# Patient Record
Sex: FEMALE | Race: OTHER | HISPANIC OR LATINO | URBAN - METROPOLITAN AREA
[De-identification: names, ages, dates, MRNs, and addresses within clinical notes are randomized per-mention and may not be internally consistent; named-entity substitution may affect disease eponyms.]

---

## 2020-09-10 ENCOUNTER — OUTPATIENT (OUTPATIENT)
Dept: OUTPATIENT SERVICES | Facility: HOSPITAL | Age: 30
LOS: 1 days | End: 2020-09-10
Payer: SELF-PAY

## 2020-09-10 ENCOUNTER — APPOINTMENT (OUTPATIENT)
Dept: FAMILY MEDICINE | Facility: HOSPITAL | Age: 30
End: 2020-09-10

## 2020-09-10 DIAGNOSIS — Z00.00 ENCOUNTER FOR GENERAL ADULT MEDICAL EXAMINATION WITHOUT ABNORMAL FINDINGS: ICD-10-CM

## 2020-09-10 PROCEDURE — G0008: CPT

## 2020-09-10 PROCEDURE — 83036 HEMOGLOBIN GLYCOSYLATED A1C: CPT

## 2020-09-10 PROCEDURE — 80053 COMPREHEN METABOLIC PANEL: CPT

## 2020-09-10 PROCEDURE — 80061 LIPID PANEL: CPT

## 2020-09-10 PROCEDURE — 36415 COLL VENOUS BLD VENIPUNCTURE: CPT

## 2020-09-10 PROCEDURE — 85025 COMPLETE CBC W/AUTO DIFF WBC: CPT

## 2020-09-10 PROCEDURE — G0463: CPT

## 2020-09-10 NOTE — HISTORY OF PRESENT ILLNESS
[[employer] ] : provided by [employer]   [FreeTextEntry1] : [id number] [FreeTextEntry2] : Karlene [TWNoteComboBox1] : Prydeinig [de-identified] : 2020\par q28 days\par menarche 13\par denies any cramping\par \par sexually active with men, monogamaous \par \par 2018\par

## 2020-09-13 DIAGNOSIS — Z30.09 ENCOUNTER FOR OTHER GENERAL COUNSELING AND ADVICE ON CONTRACEPTION: ICD-10-CM

## 2020-09-13 DIAGNOSIS — Z12.4 ENCOUNTER FOR SCREENING FOR MALIGNANT NEOPLASM OF CERVIX: ICD-10-CM

## 2020-09-13 DIAGNOSIS — Z11.3 ENCOUNTER FOR SCREENING FOR INFECTIONS WITH A PREDOMINANTLY SEXUAL MODE OF TRANSMISSION: ICD-10-CM

## 2020-09-13 DIAGNOSIS — Z23 ENCOUNTER FOR IMMUNIZATION: ICD-10-CM

## 2020-11-25 ENCOUNTER — OUTPATIENT (OUTPATIENT)
Dept: OUTPATIENT SERVICES | Facility: HOSPITAL | Age: 30
LOS: 1 days | End: 2020-11-25
Payer: SELF-PAY

## 2020-11-25 ENCOUNTER — APPOINTMENT (OUTPATIENT)
Dept: FAMILY MEDICINE | Facility: HOSPITAL | Age: 30
End: 2020-11-25

## 2020-11-25 VITALS
SYSTOLIC BLOOD PRESSURE: 102 MMHG | HEART RATE: 91 BPM | WEIGHT: 107 LBS | OXYGEN SATURATION: 98 % | DIASTOLIC BLOOD PRESSURE: 68 MMHG | TEMPERATURE: 98.5 F | RESPIRATION RATE: 16 BRPM

## 2020-11-25 DIAGNOSIS — N76.0 ACUTE VAGINITIS: ICD-10-CM

## 2020-11-25 DIAGNOSIS — Z98.890 OTHER SPECIFIED POSTPROCEDURAL STATES: ICD-10-CM

## 2020-11-25 DIAGNOSIS — Z30.09 ENCOUNTER FOR OTHER GENERAL COUNSELING AND ADVICE ON CONTRACEPTION: ICD-10-CM

## 2020-11-25 DIAGNOSIS — Z92.29 PERSONAL HISTORY OF OTHER DRUG THERAPY: ICD-10-CM

## 2020-11-25 DIAGNOSIS — B96.89 ACUTE VAGINITIS: ICD-10-CM

## 2020-11-25 DIAGNOSIS — Z00.00 ENCOUNTER FOR GENERAL ADULT MEDICAL EXAMINATION W/OUT ABNORMAL FINDINGS: ICD-10-CM

## 2020-11-25 DIAGNOSIS — Z11.3 ENCOUNTER FOR SCREENING FOR INFECTIONS WITH A PREDOMINANTLY SEXUAL MODE OF TRANSMISSION: ICD-10-CM

## 2020-11-25 DIAGNOSIS — Z00.00 ENCOUNTER FOR GENERAL ADULT MEDICAL EXAMINATION WITHOUT ABNORMAL FINDINGS: ICD-10-CM

## 2020-11-25 PROCEDURE — G0463: CPT

## 2020-11-25 NOTE — ASSESSMENT
[FreeTextEntry1] : 29 year old female who presents for f/u for lab results.\par \par Healthcare maintenance\par - lab results d/w patient\par - pap negative however incidentally found bacterial vaginosis -  will start metronidazole\par - influenza vaccine UTD\par \par F/u PRN\par D/w Dr. Levine

## 2020-11-25 NOTE — HISTORY OF PRESENT ILLNESS
[FreeTextEntry1] : F/u for lab results [de-identified] : 29 year old female who presents for f/u for lab results. No acute complaints or concerns. Denies dysuria, vaginal discharge/pruritus, abdominal pain, nausea, vomiting, fever, chills.

## 2020-11-25 NOTE — PHYSICAL EXAM
[No Acute Distress] : no acute distress [Well Developed] : well developed [Well-Appearing] : well-appearing [EOMI] : extraocular movements intact [Supple] : supple [No Respiratory Distress] : no respiratory distress  [No Accessory Muscle Use] : no accessory muscle use [Clear to Auscultation] : lungs were clear to auscultation bilaterally [Normal Rate] : normal rate  [Regular Rhythm] : with a regular rhythm [Normal S1, S2] : normal S1 and S2 [Soft] : abdomen soft [Non Tender] : non-tender [Grossly Normal Strength/Tone] : grossly normal strength/tone [Normal Gait] : normal gait [Alert and Oriented x3] : oriented to person, place, and time

## 2020-12-01 DIAGNOSIS — N16 RENAL TUBULO-INTERSTITIAL DISORDERS IN DISEASES CLASSIFIED ELSEWHERE: ICD-10-CM

## 2021-06-26 LAB
ALBUMIN SERPL ELPH-MCNC: 4.5 G/DL
ALP BLD-CCNC: 72 U/L
ALT SERPL-CCNC: 12 U/L
ANION GAP SERPL CALC-SCNC: 13 MMOL/L
AST SERPL-CCNC: 18 U/L
BASOPHILS # BLD AUTO: 0.03 K/UL
BASOPHILS NFR BLD AUTO: 0.4 %
BILIRUB SERPL-MCNC: 0.4 MG/DL
BUN SERPL-MCNC: 8 MG/DL
C TRACH RRNA SPEC QL NAA+PROBE: NOT DETECTED
CALCIUM SERPL-MCNC: 9.4 MG/DL
CHLORIDE SERPL-SCNC: 103 MMOL/L
CHOLEST SERPL-MCNC: 166 MG/DL
CHOLEST/HDLC SERPL: 3.1 RATIO
CO2 SERPL-SCNC: 22 MMOL/L
CREAT SERPL-MCNC: 0.72 MG/DL
CYTOLOGY CVX/VAG DOC THIN PREP: ABNORMAL
EOSINOPHIL # BLD AUTO: 0.19 K/UL
EOSINOPHIL NFR BLD AUTO: 2.7 %
ESTIMATED AVERAGE GLUCOSE: 108 MG/DL
GLUCOSE SERPL-MCNC: 107 MG/DL
HBA1C MFR BLD HPLC: 5.4 %
HCT VFR BLD CALC: 40.1 %
HDLC SERPL-MCNC: 54 MG/DL
HGB BLD-MCNC: 13.1 G/DL
IMM GRANULOCYTES NFR BLD AUTO: 0.3 %
LDLC SERPL CALC-MCNC: 98 MG/DL
LYMPHOCYTES # BLD AUTO: 1.84 K/UL
LYMPHOCYTES NFR BLD AUTO: 26.4 %
MAN DIFF?: NORMAL
MCHC RBC-ENTMCNC: 30.4 PG
MCHC RBC-ENTMCNC: 32.7 GM/DL
MCV RBC AUTO: 93 FL
MONOCYTES # BLD AUTO: 0.59 K/UL
MONOCYTES NFR BLD AUTO: 8.5 %
N GONORRHOEA RRNA SPEC QL NAA+PROBE: NOT DETECTED
NEUTROPHILS # BLD AUTO: 4.31 K/UL
NEUTROPHILS NFR BLD AUTO: 61.7 %
PLATELET # BLD AUTO: 346 K/UL
POTASSIUM SERPL-SCNC: 4.2 MMOL/L
PROT SERPL-MCNC: 7 G/DL
RBC # BLD: 4.31 M/UL
RBC # FLD: 12 %
SODIUM SERPL-SCNC: 137 MMOL/L
SOURCE AMPLIFICATION: NORMAL
TRIGL SERPL-MCNC: 72 MG/DL
WBC # FLD AUTO: 6.98 K/UL

## 2022-10-26 ENCOUNTER — OUTPATIENT (OUTPATIENT)
Dept: OUTPATIENT SERVICES | Facility: HOSPITAL | Age: 32
LOS: 1 days | End: 2022-10-26
Payer: SELF-PAY

## 2022-10-26 ENCOUNTER — RESULT CHARGE (OUTPATIENT)
Age: 32
End: 2022-10-26

## 2022-10-26 ENCOUNTER — APPOINTMENT (OUTPATIENT)
Dept: FAMILY MEDICINE | Facility: HOSPITAL | Age: 32
End: 2022-10-26

## 2022-10-26 VITALS
WEIGHT: 106 LBS | TEMPERATURE: 98.4 F | HEIGHT: 59 IN | SYSTOLIC BLOOD PRESSURE: 100 MMHG | DIASTOLIC BLOOD PRESSURE: 62 MMHG | OXYGEN SATURATION: 97 % | RESPIRATION RATE: 16 BRPM | BODY MASS INDEX: 21.37 KG/M2 | HEART RATE: 87 BPM

## 2022-10-26 DIAGNOSIS — Z30.9 ENCOUNTER FOR CONTRACEPTIVE MANAGEMENT, UNSPECIFIED: ICD-10-CM

## 2022-10-26 DIAGNOSIS — Z00.00 ENCOUNTER FOR GENERAL ADULT MEDICAL EXAMINATION WITHOUT ABNORMAL FINDINGS: ICD-10-CM

## 2022-10-26 DIAGNOSIS — R30.0 DYSURIA: ICD-10-CM

## 2022-10-26 LAB
BILIRUB UR QL STRIP: NORMAL
CLARITY UR: CLEAR
COLLECTION METHOD: NORMAL
GLUCOSE UR-MCNC: NORMAL
HCG UR QL: 0.2 EU/DL
HCG UR QL: NEGATIVE
HGB UR QL STRIP.AUTO: NORMAL
KETONES UR-MCNC: NORMAL
LEUKOCYTE ESTERASE UR QL STRIP: NORMAL
NITRITE UR QL STRIP: NORMAL
PH UR STRIP: 7.5
PROT UR STRIP-MCNC: NORMAL
QUALITY CONTROL: YES
SP GR UR STRIP: 1.02

## 2022-10-26 PROCEDURE — 87086 URINE CULTURE/COLONY COUNT: CPT

## 2022-10-26 PROCEDURE — G0463: CPT

## 2022-10-26 PROCEDURE — 81002 URINALYSIS NONAUTO W/O SCOPE: CPT

## 2022-10-26 PROCEDURE — 87077 CULTURE AEROBIC IDENTIFY: CPT

## 2022-10-26 PROCEDURE — 87186 SC STD MICRODIL/AGAR DIL: CPT

## 2022-10-26 NOTE — REVIEW OF SYSTEMS
[Dysuria] : dysuria [Frequency] : frequency [Fever] : no fever [Chills] : no chills [Chest Pain] : no chest pain [Palpitations] : no palpitations [Shortness Of Breath] : no shortness of breath [Wheezing] : no wheezing [Abdominal Pain] : no abdominal pain [Nausea] : no nausea [Vomiting] : no vomiting [Incontinence] : no incontinence [Nocturia] : no nocturia [Hematuria] : no hematuria [Vaginal Discharge] : no vaginal discharge [Dysmenorrhea] : no dysmenorrhea

## 2022-10-26 NOTE — PHYSICAL EXAM
[No Acute Distress] : no acute distress [Well Nourished] : well nourished [No Respiratory Distress] : no respiratory distress  [No Accessory Muscle Use] : no accessory muscle use [Clear to Auscultation] : lungs were clear to auscultation bilaterally [Normal Rate] : normal rate  [Regular Rhythm] : with a regular rhythm [Normal S1, S2] : normal S1 and S2 [Soft] : abdomen soft [Non Tender] : non-tender [Normal Bowel Sounds] : normal bowel sounds [No CVA Tenderness] : no CVA  tenderness

## 2022-10-26 NOTE — HISTORY OF PRESENT ILLNESS
[FreeTextEntry8] :  ID: [id number]\par Patient is a 30 y/o F with no significant PMH presenting with 4 days of dysuria and urinary frequency. She reports having a UTI in the past (4 years ago) and feeling a smilar way. Has tried Azo tablets from the pharmacy without relief. Denies vaginal pruritus. Is currently sexually active and does not use contraception. LMP 10/7/22.

## 2022-10-29 DIAGNOSIS — R30.0 DYSURIA: ICD-10-CM

## 2022-11-21 ENCOUNTER — NON-APPOINTMENT (OUTPATIENT)
Age: 32
End: 2022-11-21

## 2022-11-21 LAB — BACTERIA UR CULT: ABNORMAL

## 2023-01-07 ENCOUNTER — EMERGENCY (EMERGENCY)
Facility: HOSPITAL | Age: 33
LOS: 1 days | Discharge: HOME | End: 2023-01-07
Attending: INTERNAL MEDICINE | Admitting: STUDENT IN AN ORGANIZED HEALTH CARE EDUCATION/TRAINING PROGRAM
Payer: MEDICAID

## 2023-01-07 VITALS
DIASTOLIC BLOOD PRESSURE: 71 MMHG | WEIGHT: 100.09 LBS | HEART RATE: 73 BPM | RESPIRATION RATE: 16 BRPM | OXYGEN SATURATION: 100 % | SYSTOLIC BLOOD PRESSURE: 110 MMHG | TEMPERATURE: 98 F | HEIGHT: 59.06 IN

## 2023-01-07 VITALS
HEART RATE: 77 BPM | RESPIRATION RATE: 16 BRPM | SYSTOLIC BLOOD PRESSURE: 101 MMHG | DIASTOLIC BLOOD PRESSURE: 61 MMHG | OXYGEN SATURATION: 100 %

## 2023-01-07 LAB
ALBUMIN SERPL ELPH-MCNC: 4 G/DL — SIGNIFICANT CHANGE UP (ref 3.3–5)
ALP SERPL-CCNC: 67 U/L — SIGNIFICANT CHANGE UP (ref 40–120)
ALT FLD-CCNC: 14 U/L — SIGNIFICANT CHANGE UP (ref 10–45)
ANION GAP SERPL CALC-SCNC: 7 MMOL/L — SIGNIFICANT CHANGE UP (ref 5–17)
AST SERPL-CCNC: 18 U/L — SIGNIFICANT CHANGE UP (ref 10–40)
BASOPHILS # BLD AUTO: 0.05 K/UL — SIGNIFICANT CHANGE UP (ref 0–0.2)
BASOPHILS NFR BLD AUTO: 0.5 % — SIGNIFICANT CHANGE UP (ref 0–2)
BILIRUB SERPL-MCNC: 0.5 MG/DL — SIGNIFICANT CHANGE UP (ref 0.2–1.2)
BUN SERPL-MCNC: 12 MG/DL — SIGNIFICANT CHANGE UP (ref 7–23)
CALCIUM SERPL-MCNC: 9.1 MG/DL — SIGNIFICANT CHANGE UP (ref 8.4–10.5)
CHLORIDE SERPL-SCNC: 102 MMOL/L — SIGNIFICANT CHANGE UP (ref 96–108)
CO2 SERPL-SCNC: 28 MMOL/L — SIGNIFICANT CHANGE UP (ref 22–31)
CREAT SERPL-MCNC: 0.76 MG/DL — SIGNIFICANT CHANGE UP (ref 0.5–1.3)
EGFR: 108 ML/MIN/1.73M2 — SIGNIFICANT CHANGE UP
EOSINOPHIL # BLD AUTO: 0.06 K/UL — SIGNIFICANT CHANGE UP (ref 0–0.5)
EOSINOPHIL NFR BLD AUTO: 0.5 % — SIGNIFICANT CHANGE UP (ref 0–6)
FLUAV AG NPH QL: SIGNIFICANT CHANGE UP
FLUBV AG NPH QL: SIGNIFICANT CHANGE UP
GLUCOSE SERPL-MCNC: 103 MG/DL — HIGH (ref 70–99)
HCG SERPL-ACNC: <0.6 MIU/ML — SIGNIFICANT CHANGE UP
HCT VFR BLD CALC: 40.8 % — SIGNIFICANT CHANGE UP (ref 34.5–45)
HGB BLD-MCNC: 13.9 G/DL — SIGNIFICANT CHANGE UP (ref 11.5–15.5)
IMM GRANULOCYTES NFR BLD AUTO: 0.3 % — SIGNIFICANT CHANGE UP (ref 0–0.9)
LIDOCAIN IGE QN: 136 U/L — SIGNIFICANT CHANGE UP (ref 73–393)
LYMPHOCYTES # BLD AUTO: 19.7 % — SIGNIFICANT CHANGE UP (ref 13–44)
LYMPHOCYTES # BLD AUTO: 2.16 K/UL — SIGNIFICANT CHANGE UP (ref 1–3.3)
MCHC RBC-ENTMCNC: 30.7 PG — SIGNIFICANT CHANGE UP (ref 27–34)
MCHC RBC-ENTMCNC: 34.1 GM/DL — SIGNIFICANT CHANGE UP (ref 32–36)
MCV RBC AUTO: 90.1 FL — SIGNIFICANT CHANGE UP (ref 80–100)
MONOCYTES # BLD AUTO: 0.32 K/UL — SIGNIFICANT CHANGE UP (ref 0–0.9)
MONOCYTES NFR BLD AUTO: 2.9 % — SIGNIFICANT CHANGE UP (ref 2–14)
NEUTROPHILS # BLD AUTO: 8.35 K/UL — HIGH (ref 1.8–7.4)
NEUTROPHILS NFR BLD AUTO: 76.1 % — SIGNIFICANT CHANGE UP (ref 43–77)
NRBC # BLD: 0 /100 WBCS — SIGNIFICANT CHANGE UP (ref 0–0)
PLATELET # BLD AUTO: 367 K/UL — SIGNIFICANT CHANGE UP (ref 150–400)
POTASSIUM SERPL-MCNC: 3.9 MMOL/L — SIGNIFICANT CHANGE UP (ref 3.5–5.3)
POTASSIUM SERPL-SCNC: 3.9 MMOL/L — SIGNIFICANT CHANGE UP (ref 3.5–5.3)
PROT SERPL-MCNC: 7.8 G/DL — SIGNIFICANT CHANGE UP (ref 6–8.3)
RBC # BLD: 4.53 M/UL — SIGNIFICANT CHANGE UP (ref 3.8–5.2)
RBC # FLD: 11.3 % — SIGNIFICANT CHANGE UP (ref 10.3–14.5)
RSV RNA NPH QL NAA+NON-PROBE: SIGNIFICANT CHANGE UP
SARS-COV-2 RNA SPEC QL NAA+PROBE: DETECTED
SODIUM SERPL-SCNC: 137 MMOL/L — SIGNIFICANT CHANGE UP (ref 135–145)
WBC # BLD: 10.97 K/UL — HIGH (ref 3.8–10.5)
WBC # FLD AUTO: 10.97 K/UL — HIGH (ref 3.8–10.5)

## 2023-01-07 PROCEDURE — 99284 EMERGENCY DEPT VISIT MOD MDM: CPT

## 2023-01-07 PROCEDURE — 85025 COMPLETE CBC W/AUTO DIFF WBC: CPT

## 2023-01-07 PROCEDURE — 83690 ASSAY OF LIPASE: CPT

## 2023-01-07 PROCEDURE — 80053 COMPREHEN METABOLIC PANEL: CPT

## 2023-01-07 PROCEDURE — 84702 CHORIONIC GONADOTROPIN TEST: CPT

## 2023-01-07 PROCEDURE — 96374 THER/PROPH/DIAG INJ IV PUSH: CPT

## 2023-01-07 PROCEDURE — 87637 SARSCOV2&INF A&B&RSV AMP PRB: CPT

## 2023-01-07 PROCEDURE — 36415 COLL VENOUS BLD VENIPUNCTURE: CPT

## 2023-01-07 PROCEDURE — 99284 EMERGENCY DEPT VISIT MOD MDM: CPT | Mod: 25

## 2023-01-07 PROCEDURE — 96375 TX/PRO/DX INJ NEW DRUG ADDON: CPT

## 2023-01-07 PROCEDURE — 96361 HYDRATE IV INFUSION ADD-ON: CPT

## 2023-01-07 RX ORDER — ACETAMINOPHEN 500 MG
650 TABLET ORAL ONCE
Refills: 0 | Status: COMPLETED | OUTPATIENT
Start: 2023-01-07 | End: 2023-01-07

## 2023-01-07 RX ORDER — DIPHENHYDRAMINE HCL 50 MG
25 CAPSULE ORAL ONCE
Refills: 0 | Status: COMPLETED | OUTPATIENT
Start: 2023-01-07 | End: 2023-01-07

## 2023-01-07 RX ORDER — SODIUM CHLORIDE 9 MG/ML
1000 INJECTION INTRAMUSCULAR; INTRAVENOUS; SUBCUTANEOUS ONCE
Refills: 0 | Status: COMPLETED | OUTPATIENT
Start: 2023-01-07 | End: 2023-01-07

## 2023-01-07 RX ORDER — METOCLOPRAMIDE HCL 10 MG
10 TABLET ORAL ONCE
Refills: 0 | Status: COMPLETED | OUTPATIENT
Start: 2023-01-07 | End: 2023-01-07

## 2023-01-07 RX ORDER — ONDANSETRON 8 MG/1
1 TABLET, FILM COATED ORAL
Qty: 9 | Refills: 0
Start: 2023-01-07 | End: 2023-01-09

## 2023-01-07 RX ORDER — DIPHENHYDRAMINE HCL 50 MG
25 CAPSULE ORAL ONCE
Refills: 0 | Status: DISCONTINUED | OUTPATIENT
Start: 2023-01-07 | End: 2023-01-07

## 2023-01-07 RX ADMIN — Medication 104 MILLIGRAM(S): at 20:08

## 2023-01-07 RX ADMIN — Medication 25 MILLIGRAM(S): at 21:03

## 2023-01-07 RX ADMIN — SODIUM CHLORIDE 1000 MILLILITER(S): 9 INJECTION INTRAMUSCULAR; INTRAVENOUS; SUBCUTANEOUS at 20:08

## 2023-01-07 RX ADMIN — Medication 650 MILLIGRAM(S): at 20:23

## 2023-01-07 NOTE — ED PROVIDER NOTE - CARE PROVIDER_API CALL
Prem Huitron (MD)  Neurology  [address], [address]  [place], [place] [id number]  Phone: [phone]  Fax: [phone]  Follow Up Time:

## 2023-01-07 NOTE — ED PROVIDER NOTE - PHYSICAL EXAMINATION
Gen: Well appearing in NAD.  AAOx3  ENT: oral mucosa moist   Head: atraumatic  Heart: s1/s2, RRR  Lung: CTA b/l, no wheezing/rhonchi or rales.   Abd: soft, NT/ND, no rebound or guarding  Msk: no pedal edema or calf pain,  Neuro: patient moving all extremity equally  Skin: Normal for race.   Psych: Alert and oriented

## 2023-01-07 NOTE — ED PROVIDER NOTE - CLINICAL SUMMARY MEDICAL DECISION MAKING FREE TEXT BOX
33 y/o F with past medical history of chronic headaches presents to the ED with complaint of frontal headache associated with 4-5 episodes of nonbloody nonbilious emesis times today.  Patient reports this is typical with her headaches, she normally takes Tylenol and they resolved.  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  She denies any fever, diarrhea, abdominal pain, cough, chest pain, shortness of breath.  She denies any visual changes, numbness, tingling or all other complaints. LMP was 1 week ago. PE as noted above. labs pending. IVF hydration, meds given, reassess 33 y/o F with past medical history of chronic headaches presents to the ED with complaint of frontal headache associated with 4-5 episodes of nonbloody nonbilious emesis times today.  Patient reports this is typical with her headaches, she normally takes Tylenol and they resolved.  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  She denies any fever, diarrhea, abdominal pain, cough, chest pain, shortness of breath.  She denies any visual changes, numbness, tingling or all other complaints. LMP was 1 week ago. PE as noted above. labs pending. IVF hydration, meds given, reassess    847pm: labs reviewed. Pt reports she feels better with the meds, her HA is 2/10 now and she is no longer nauseous. Will dc and place in [facility] for neuro f/u

## 2023-01-07 NOTE — ED PROVIDER NOTE - OBJECTIVE STATEMENT
#[id number]  31 y/o F with past medical history of chronic headaches presents to the ED with complaint of frontal headache associated with 4-5 episodes of nonbloody nonbilious emesis times today.  Patient reports this is typical with her headaches, she normally takes Tylenol and they resolved.  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  She denies any fever, diarrhea, abdominal pain, cough, chest pain, shortness of breath.  She denies any visual changes, numbness, tingling or all other complaints. LMP was 1 week ago.

## 2023-01-07 NOTE — ED PROVIDER NOTE - PATIENT PORTAL LINK FT
You can access the FollowMyHealth Patient Portal offered by [facility] by registering at the following website: [email or web address]. By joining [employer]’s FollowMyHealth portal, you will also be able to view your health information using other applications (apps) compatible with our system.

## 2023-01-07 NOTE — ED PROVIDER NOTE - NSFOLLOWUPINSTRUCTIONS_ED_ALL_ED_FT
Follow up with your primary care physician within 2-3 days. Take the copy of your test results you were given in the emergency room for your doctor to review.   Take the prescribed medication as directed   Stay hydrated  Return to the ER if your symptoms worsen or for any other medical emergencies  ********    Mary las etiquetas de todos los demás medicamentos que esté usando para saber si también contienen acetaminofén, o pregunte a castellano médico o farmacéutico. El acetaminofén puede causar daño en el hígado cuando no se sunshine de forma correcta.      ?Puede administrarsepodrían administrarse. Pregunte al médico cómo debe naveen jannette medicamento de forma smalls. Algunos medicamentos recetados para el dolor contienen acetaminofén. No tome otros medicamentos que contengan acetaminofén sin consultarlo con castellano médico. Demasiado acetaminofeno puede causar daño al hígado. Los medicamentos recetados para el dolor podrían causar estreñimiento. Pregunte a castellano médico brien prevenir o tratar estreñimiento.      ?Los medicamentos contra las náuseaspueden darse para calmar castellano estómago y ayudarle a prevenir los vómitos. Jannette medicamento también puede aliviar el dolor.      •La terapia cognitiva conductual (TCC)puede ayudarlo a aprender maneras de controlar y prevenir las migrañas. Un terapeuta puede enseñarle a relajarse y a reducir el estrés. Puede aprender maneras de crear fadumo nutrición saludable, actividad y hábitos de sueño para prevenir las migrañas. El terapeuta también puede ayudarlo a controlar afecciones que pueden afectar las migrañas, brien la ansiedad o la depresión.      ¿Qué puedo hacer para manejar mis síntomas?  •Repose en fadumo habitación oscura y tranquila.[place] ayudará a disminuir el dolor. El dormir también podría ayudarlo a aliviar castellano dolor.      •Aplique hielo para reducir el dolor.Use fadumo compresa de hielo o ponga hielo triturado en fadumo bolsa de plástico. Cubra el paquete de hielo con fadumo toalla y colóqueselo en la williams. Aplique hielo migel 15 a 20 minutos cada hora.      •Aplique calor para disminuir el dolor y los espasmos musculares.Utilice fadumo toalla pequeña empapada con [place], fadumo almohada térmica o tome un baño de aman con agua tibia. Aplique la compresa caliente sobre el área por 20 a 30 minutos cada 2 horas. Usted puede alternar el calor y el hielo.      •Mantenga un registro de las migrañas.Escriba cuándo comienzan y terminan sabrina migrañas. Incluya sabrina síntomas y qué estaba haciendo cuando comenzó fadumo migraña. Registre lo que comió y lo que tomó las 24 horas antes de que comenzara castellano migraña. Mantenga un registro de lo que hizo para tratar castellano migraña y si funcionó. Traiga el registro de las migrañas con usted a las citas con castellano médico.      ¿Qué puedo hacer para evitar otra migraña?  •Evite el dolor de williams por uso excesivo de medicamentos.[place] los analgésicos solamente según le hayan indicado. Un medicamento puede estar limitado a fadumo cierta cantidad cada mes. El médico puede ayudarlo a crear un plan para que reciba fadumo cantidad smalls cada mes.      •No fume.La nicotina y otras sustancias químicas en los cigarrillos y puros pueden desencadenar fadumo migraña o agravarla. Pida información a castellano médico si usted actualmente fuma y necesita ayuda para dejar de fumar. Los cigarrillos electrónicos o el tabaco sin humo igualmente contienen nicotina. Consulte con castellano médico antes de utilizar estos productos.      •No consuma alcohol.El alcohol puede provocar migraña. También puede impedir que tengan efecto los medicamentos para la migraña.      •Sea físicamente activo.La actividad física, brien el ejercicio, puede ayudar a prevenir las migrañas. Consulte con castellano médico acerca del mejor plan de actividad para usted. Trate de hacer al menos 30 minutos de actividad física la mayoría de los peggy.  TRI ASIÁTICA CAMINANDO BRIEN EJERCICIO           •Controle el estrés.El estrés podría provocar migraña. Aprenda nuevas maneras de relajarse brien la respiración profunda.      •Establezca un horario para dormir.Acuéstese y levántese a la misma hora cada día. No ashok televisión inmediatamente antes de acostarse.      •Consuma alimentos saludables y variados.Incluya alimentos saludables brien la fruta, verduras, panes de grano entero, productos lácteos bajos en grasa, frijoles, carne magra y pescado. No consuma alimentos o bebidas que puedan desencadenar sabrina migrañas.  Alimentos saludables           •[place] más líquidos para evitar la deshidratación.Castellano médico le indicará cuánto líquido debería beber a diario y qué líquidos son los mejores para usted.      Llame al número local de emergencias (911 en los [place]) o pídale a alguien que llame si:  •Usted siente que se va a desmayar, se siente confundido o sufre fadumo convulsión.          ¿Cuándo howard buscar atención inmediata?  •Usted tiene dolor de williams que parece ser diferente o mucho peor que castellano migraña habitual.      •Usted tiene un dolor de williams severo con fiebre o rigidez en el eloisa.      •Usted tiene nuevos problemas con el habla, la visión, el equilibrio o el movimiento.      ¿Cuándo howard llamar a mi médico?  •Castellano migraña interfiere con sabrina actividades cotidianas.      •Sabrina medicamentos o tratamientos puma de funcionar.      •Usted tiene preguntas o inquietudes acerca de castellano condición o cuidado.      ACUERDOS SOBRE CASTELLANO CUIDADO:    Usted tiene el derecho de ayudar a planear castellano cuidado. Aprenda todo lo que pueda sobre castellano condición y brien darle tratamiento. Discuta sabrina opciones de tratamiento con sabrina médicos para decidir el cuidado que usted desea recibir. Usted siempre tiene el derecho de rechazar el tratamiento.

## 2023-01-07 NOTE — ED PROVIDER NOTE - ATTENDING CONTRIBUTION TO CARE
33 y/o F with past medical history of chronic headaches presents to the ED with complaint of frontal headache associated with 4-5 episodes of nonbloody nonbilious emesis times today.  Patient reports this is typical with her headaches, she normally takes Tylenol and they resolved.  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  She denies any fever, diarrhea, abdominal pain, cough, chest pain, shortness of breath.  She denies any visual changes, numbness, tingling or all other complaints. LMP was 1 week ago. PE as noted above. labs pending. IVF hydration, meds given, reassess    847pm: labs reviewed. Pt reports she feels better with the meds, her HA is 2/10 now and she is no longer nauseous. Will dc and place in [facility] for neuro f/u  Dr. Coffman:  I have reviewed and discussed with the PA/ resident the case specifics, including the history, physical assessment, evaluation, conclusion, laboratory results, and medical plan. I agree with the contents, and conclusions. I have personally examined, and interviewed the patient.

## 2023-01-11 PROBLEM — Z78.9 OTHER SPECIFIED HEALTH STATUS: Chronic | Status: ACTIVE | Noted: 2023-01-07

## 2023-01-18 ENCOUNTER — OUTPATIENT (OUTPATIENT)
Dept: OUTPATIENT SERVICES | Facility: HOSPITAL | Age: 33
LOS: 1 days | End: 2023-01-18
Payer: SELF-PAY

## 2023-01-18 ENCOUNTER — APPOINTMENT (OUTPATIENT)
Dept: FAMILY MEDICINE | Facility: HOSPITAL | Age: 33
End: 2023-01-18

## 2023-01-18 VITALS
HEART RATE: 60 BPM | RESPIRATION RATE: 16 BRPM | SYSTOLIC BLOOD PRESSURE: 102 MMHG | OXYGEN SATURATION: 100 % | WEIGHT: 106 LBS | TEMPERATURE: 97.4 F | DIASTOLIC BLOOD PRESSURE: 60 MMHG | BODY MASS INDEX: 21.41 KG/M2

## 2023-01-18 DIAGNOSIS — Z00.00 ENCOUNTER FOR GENERAL ADULT MEDICAL EXAMINATION WITHOUT ABNORMAL FINDINGS: ICD-10-CM

## 2023-01-18 DIAGNOSIS — G43.009 MIGRAINE W/OUT AURA, NOT INTRACTABLE, W/OUT STATUS MIGRAINOSUS: ICD-10-CM

## 2023-01-18 DIAGNOSIS — R51.9 HEADACHE, UNSPECIFIED: ICD-10-CM

## 2023-01-18 PROCEDURE — G0463: CPT

## 2023-01-18 RX ORDER — NITROFURANTOIN (MONOHYDRATE/MACROCRYSTALS) 25; 75 MG/1; MG/1
100 CAPSULE ORAL
Qty: 10 | Refills: 0 | Status: COMPLETED | COMMUNITY
Start: 2022-10-26 | End: 2023-01-18

## 2023-01-18 RX ORDER — MEDROXYPROGESTERONE ACETATE 150 MG/ML
150 INJECTION, SUSPENSION INTRAMUSCULAR
Qty: 150 | Refills: 3 | Status: COMPLETED | COMMUNITY
Start: 2022-10-26 | End: 2023-01-18

## 2023-01-18 NOTE — REVIEW OF SYSTEMS
[Nausea] : nausea [Vomiting] : vomiting [Headache] : headache [Dizziness] : no dizziness [Fainting] : no fainting [Confusion] : no confusion [Memory Loss] : no memory loss [Unsteady Walking] : no ataxia [Negative] : Psychiatric

## 2023-01-18 NOTE — HISTORY OF PRESENT ILLNESS
[FreeTextEntry1] : ED FU  [de-identified] : 33 yo with Hx of chronic headaches presents for FU after recent ED visit where she presented for HA and vomiting, found to be COVID+. Pt reports has been reporting frontal HA associated with nausea for little over a year. Reports blurry vision only when driving at night. Reports FMHx of migraines. Pt reports episodes of pain occur ~2 x week dueing the day, improve with Tylenol, denies waking up at night 2/2 HA.

## 2023-01-18 NOTE — PHYSICAL EXAM
[Normal] : no acute distress, well nourished, well developed and well-appearing [Normal Sclera/Conjunctiva] : normal sclera/conjunctiva [PERRL] : pupils equal round and reactive to light [EOMI] : extraocular movements intact [No Respiratory Distress] : no respiratory distress  [Coordination Grossly Intact] : coordination grossly intact [No Focal Deficits] : no focal deficits [Normal Gait] : normal gait

## 2023-01-18 NOTE — PLAN
[FreeTextEntry1] : \par \par RTC for cpe with PCP, will need cervical ca screening \par \par \par d/w Dr Durant

## 2023-02-02 ENCOUNTER — APPOINTMENT (OUTPATIENT)
Dept: NEUROLOGY | Facility: HOSPITAL | Age: 33
End: 2023-02-02

## 2023-02-16 ENCOUNTER — APPOINTMENT (OUTPATIENT)
Dept: FAMILY MEDICINE | Facility: HOSPITAL | Age: 33
End: 2023-02-16

## 2023-02-16 ENCOUNTER — OUTPATIENT (OUTPATIENT)
Dept: OUTPATIENT SERVICES | Facility: HOSPITAL | Age: 33
LOS: 1 days | End: 2023-02-16
Payer: SELF-PAY

## 2023-02-16 ENCOUNTER — MED ADMIN CHARGE (OUTPATIENT)
Age: 33
End: 2023-02-16

## 2023-02-16 VITALS
HEART RATE: 67 BPM | OXYGEN SATURATION: 100 % | WEIGHT: 107 LBS | RESPIRATION RATE: 16 BRPM | TEMPERATURE: 97.9 F | SYSTOLIC BLOOD PRESSURE: 100 MMHG | BODY MASS INDEX: 21.61 KG/M2 | DIASTOLIC BLOOD PRESSURE: 67 MMHG

## 2023-02-16 DIAGNOSIS — Z00.00 ENCOUNTER FOR GENERAL ADULT MEDICAL EXAMINATION WITHOUT ABNORMAL FINDINGS: ICD-10-CM

## 2023-02-16 DIAGNOSIS — Z00.00 ENCOUNTER FOR GENERAL ADULT MEDICAL EXAMINATION W/OUT ABNORMAL FINDINGS: ICD-10-CM

## 2023-02-16 DIAGNOSIS — Z12.72 ENCOUNTER FOR SCREENING FOR MALIGNANT NEOPLASM OF VAGINA: ICD-10-CM

## 2023-02-16 DIAGNOSIS — Z23 ENCOUNTER FOR IMMUNIZATION: ICD-10-CM

## 2023-02-16 PROCEDURE — G0463: CPT

## 2023-02-16 PROCEDURE — 87624 HPV HI-RISK TYP POOLED RSLT: CPT

## 2023-02-16 NOTE — HISTORY OF PRESENT ILLNESS
[FreeTextEntry8] : Pt is a 33 yo, no significant PMH, presenting for pap smear. Today she feels well, asx, presenting for routine pap with no hx of previous cervical malignancy or family hx of same. Pt denies denies any fevers, night sweets, sudden unexplained weight loss, urogenital discharge or bleeding, new onset lymphadenopathy. No other urgent sxs or complaints reported at this time.\par

## 2023-02-16 NOTE — PHYSICAL EXAM
[No Acute Distress] : no acute distress [Well-Appearing] : well-appearing [Normal Voice/Communication] : normal voice/communication [Normal Sclera/Conjunctiva] : normal sclera/conjunctiva [PERRL] : pupils equal round and reactive to light [EOMI] : extraocular movements intact [Normal Outer Ear/Nose] : the outer ears and nose were normal in appearance [Supple] : supple [No Respiratory Distress] : no respiratory distress  [No Accessory Muscle Use] : no accessory muscle use [Clear to Auscultation] : lungs were clear to auscultation bilaterally [Normal Rate] : normal rate  [Regular Rhythm] : with a regular rhythm [Normal S1, S2] : normal S1 and S2 [Soft] : abdomen soft [Non Tender] : non-tender [Non-distended] : non-distended [Urethral Meatus] : normal urethra [External Female Genitalia] : normal external genitalia [Vagina] : normal vaginal exam [Cervix] : normal cervix

## 2023-02-19 ENCOUNTER — NON-APPOINTMENT (OUTPATIENT)
Age: 33
End: 2023-02-19

## 2023-02-21 DIAGNOSIS — Z12.2 ENCOUNTER FOR SCREENING FOR MALIGNANT NEOPLASM OF RESPIRATORY ORGANS: ICD-10-CM

## 2023-02-21 DIAGNOSIS — Z23 ENCOUNTER FOR IMMUNIZATION: ICD-10-CM

## 2023-02-23 ENCOUNTER — NON-APPOINTMENT (OUTPATIENT)
Age: 33
End: 2023-02-23

## 2023-03-02 LAB
CYTOLOGY CVX/VAG DOC THIN PREP: ABNORMAL
HPV HIGH+LOW RISK DNA PNL CVX: DETECTED

## 2023-03-02 RX ORDER — METRONIDAZOLE 500 MG/1
500 TABLET ORAL TWICE DAILY
Qty: 14 | Refills: 0 | Status: ACTIVE | COMMUNITY
Start: 2020-11-25 | End: 1900-01-01

## 2023-03-08 ENCOUNTER — APPOINTMENT (OUTPATIENT)
Dept: FAMILY MEDICINE | Facility: HOSPITAL | Age: 33
End: 2023-03-08

## 2023-05-18 NOTE — ED ADULT TRIAGE NOTE - MODE OF ARRIVAL
[facility] Call Center    Phone Message    May a detailed message be left on voicemail: yes     Reason for Call: Medication Refill Request    Has the patient contacted the pharmacy for the refill? Yes   Name of medication being requested: HYDROcodone-acetaminophen (NORCO) 5-325 MG tablet  Provider who prescribed the medication: Bradley Gamboa MD  Pharmacy: [id number] - [place], [place] - [address] AT [address] [place] [address] & [facility]  Date medication is needed: asap   Please call once order has been sent       Action Taken: Message routed to:  [facility] pcc    Travel Screening: Not Applicable                                                                       Private Auto Walk in

## 2023-05-25 ENCOUNTER — APPOINTMENT (OUTPATIENT)
Dept: NEUROLOGY | Facility: HOSPITAL | Age: 33
End: 2023-05-25

## 2025-02-10 ENCOUNTER — APPOINTMENT (OUTPATIENT)
Age: 35
End: 2025-02-10

## 2025-02-10 ENCOUNTER — OUTPATIENT (OUTPATIENT)
Dept: OUTPATIENT SERVICES | Facility: HOSPITAL | Age: 35
LOS: 1 days | End: 2025-02-10
Payer: SELF-PAY

## 2025-02-10 ENCOUNTER — RESULT CHARGE (OUTPATIENT)
Age: 35
End: 2025-02-10

## 2025-02-10 ENCOUNTER — MED ADMIN CHARGE (OUTPATIENT)
Age: 35
End: 2025-02-10

## 2025-02-10 VITALS
HEART RATE: 75 BPM | BODY MASS INDEX: 23.03 KG/M2 | SYSTOLIC BLOOD PRESSURE: 103 MMHG | TEMPERATURE: 98.3 F | OXYGEN SATURATION: 99 % | DIASTOLIC BLOOD PRESSURE: 68 MMHG | RESPIRATION RATE: 16 BRPM | WEIGHT: 114 LBS

## 2025-02-10 DIAGNOSIS — N89.8 OTHER SPECIFIED NONINFLAMMATORY DISORDERS OF VAGINA: ICD-10-CM

## 2025-02-10 DIAGNOSIS — R82.90 UNSPECIFIED ABNORMAL FINDINGS IN URINE: ICD-10-CM

## 2025-02-10 DIAGNOSIS — Z86.19 PERSONAL HISTORY OF OTHER INFECTIOUS AND PARASITIC DISEASES: ICD-10-CM

## 2025-02-10 DIAGNOSIS — Z12.4 ENCOUNTER FOR SCREENING FOR MALIGNANT NEOPLASM OF CERVIX: ICD-10-CM

## 2025-02-10 DIAGNOSIS — Z00.00 ENCOUNTER FOR GENERAL ADULT MEDICAL EXAMINATION WITHOUT ABNORMAL FINDINGS: ICD-10-CM

## 2025-02-10 LAB
BILIRUB UR QL STRIP: NEGATIVE
CLARITY UR: CLEAR
COLLECTION METHOD: NORMAL
GLUCOSE UR-MCNC: NEGATIVE
HCG UR QL: 0.2 EU/DL
HGB UR QL STRIP.AUTO: NEGATIVE
KETONES UR-MCNC: NEGATIVE
LEUKOCYTE ESTERASE UR QL STRIP: NEGATIVE
NITRITE UR QL STRIP: NEGATIVE
PH UR STRIP: 8
PROT UR STRIP-MCNC: NEGATIVE
SP GR UR STRIP: 1.01

## 2025-02-11 PROCEDURE — G0463: CPT

## 2025-02-11 PROCEDURE — 87800 DETECT AGNT MULT DNA DIREC: CPT

## 2025-02-11 PROCEDURE — 81001 URINALYSIS AUTO W/SCOPE: CPT

## 2025-02-11 PROCEDURE — 87624 HPV HI-RISK TYP POOLED RSLT: CPT

## 2025-02-11 PROCEDURE — 81002 URINALYSIS NONAUTO W/O SCOPE: CPT

## 2025-02-11 PROCEDURE — 87086 URINE CULTURE/COLONY COUNT: CPT

## 2025-02-12 DIAGNOSIS — N76.0 ACUTE VAGINITIS: ICD-10-CM

## 2025-02-12 DIAGNOSIS — B96.89 ACUTE VAGINITIS: ICD-10-CM

## 2025-02-12 LAB
APPEARANCE: CLEAR
BACTERIA UR CULT: NORMAL
BACTERIA: NEGATIVE /HPF
BILIRUBIN URINE: NEGATIVE
BLOOD URINE: NEGATIVE
CANDIDA VAG CYTO: NOT DETECTED
CAST: 0 /LPF
COLOR: YELLOW
EPITHELIAL CELLS: 1 /HPF
G VAGINALIS+PREV SP MTYP VAG QL MICRO: DETECTED
GLUCOSE QUALITATIVE U: NEGATIVE MG/DL
HPV HIGH+LOW RISK DNA PNL CVX: NOT DETECTED
KETONES URINE: NEGATIVE MG/DL
LEUKOCYTE ESTERASE URINE: NEGATIVE
MICROSCOPIC-UA: NORMAL
NITRITE URINE: NEGATIVE
PH URINE: 8
PROTEIN URINE: NEGATIVE MG/DL
RED BLOOD CELLS URINE: 0 /HPF
SPECIFIC GRAVITY URINE: 1.01
T VAGINALIS VAG QL WET PREP: NOT DETECTED
UROBILINOGEN URINE: 0.2 MG/DL
WHITE BLOOD CELLS URINE: 0 /HPF

## 2025-02-12 RX ORDER — METRONIDAZOLE 500 MG/1
500 TABLET ORAL TWICE DAILY
Qty: 14 | Refills: 0 | Status: ACTIVE | COMMUNITY
Start: 2025-02-12 | End: 1900-01-01

## 2025-02-18 ENCOUNTER — NON-APPOINTMENT (OUTPATIENT)
Age: 35
End: 2025-02-18

## 2025-02-18 LAB — CYTOLOGY CVX/VAG DOC THIN PREP: NORMAL

## 2025-03-24 ENCOUNTER — APPOINTMENT (OUTPATIENT)
Age: 35
End: 2025-03-24